# Patient Record
Sex: FEMALE | Race: WHITE | NOT HISPANIC OR LATINO | URBAN - METROPOLITAN AREA
[De-identification: names, ages, dates, MRNs, and addresses within clinical notes are randomized per-mention and may not be internally consistent; named-entity substitution may affect disease eponyms.]

---

## 2023-10-06 ENCOUNTER — APPOINTMENT (OUTPATIENT)
Dept: GASTROENTEROLOGY | Facility: CLINIC | Age: 33
End: 2023-10-06
Payer: COMMERCIAL

## 2023-10-08 PROBLEM — R10.9 BILATERAL FLANK PAIN: Status: ACTIVE | Noted: 2023-10-08

## 2023-10-08 PROBLEM — N13.70 VESICOURETERAL-REFLUX: Status: ACTIVE | Noted: 2023-10-08

## 2023-10-08 RX ORDER — IBUPROFEN 800 MG/1
TABLET ORAL
COMMUNITY

## 2023-10-08 RX ORDER — MUPIROCIN 20 MG/G
OINTMENT TOPICAL
COMMUNITY
End: 2023-10-10 | Stop reason: WASHOUT

## 2023-10-08 RX ORDER — POLYMYXIN B SULFATE AND TRIMETHOPRIM 1; 10000 MG/ML; [USP'U]/ML
SOLUTION OPHTHALMIC
COMMUNITY
End: 2023-10-10 | Stop reason: WASHOUT

## 2023-10-08 RX ORDER — DICYCLOMINE HYDROCHLORIDE 20 MG/1
20 TABLET ORAL 3 TIMES DAILY PRN
COMMUNITY

## 2023-10-10 ENCOUNTER — OFFICE VISIT (OUTPATIENT)
Dept: GASTROENTEROLOGY | Facility: CLINIC | Age: 33
End: 2023-10-10
Payer: COMMERCIAL

## 2023-10-10 VITALS
BODY MASS INDEX: 31.41 KG/M2 | HEIGHT: 64 IN | DIASTOLIC BLOOD PRESSURE: 79 MMHG | HEART RATE: 81 BPM | WEIGHT: 184 LBS | SYSTOLIC BLOOD PRESSURE: 116 MMHG

## 2023-10-10 DIAGNOSIS — K62.5 RECTAL BLEEDING: ICD-10-CM

## 2023-10-10 DIAGNOSIS — R10.30 LOWER ABDOMINAL PAIN: Chronic | ICD-10-CM

## 2023-10-10 DIAGNOSIS — K52.9 CHRONIC DIARRHEA: Primary | Chronic | ICD-10-CM

## 2023-10-10 PROCEDURE — 99204 OFFICE O/P NEW MOD 45 MIN: CPT | Performed by: PHYSICIAN ASSISTANT

## 2023-10-10 RX ORDER — FAMOTIDINE 20 MG/1
20 TABLET, FILM COATED ORAL DAILY
COMMUNITY

## 2023-10-10 RX ORDER — POLYETHYLENE GLYCOL 3350, SODIUM SULFATE ANHYDROUS, SODIUM BICARBONATE, SODIUM CHLORIDE, POTASSIUM CHLORIDE 236; 22.74; 6.74; 5.86; 2.97 G/4L; G/4L; G/4L; G/4L; G/4L
4000 POWDER, FOR SOLUTION ORAL ONCE
Qty: 4000 ML | Refills: 0 | Status: SHIPPED | OUTPATIENT
Start: 2023-10-10 | End: 2023-10-10

## 2023-10-10 NOTE — PROGRESS NOTES
"Subjective   Patient ID: Nataliya Bal is a 33 y.o. female who was referred by her PCP for New Patient Visit (Referred by Sravanthi Delong for IBS with alternating bowel habits (More diarrhea than constipation)/Colon: none per pt.).    Patient's PCP is EUSEBIO Delong.    HPI  Patient states that she has had long standing issues with constipation for \"forever\". However, in the past, she was more constipated requiring miralax. Over the past year, she has had a change in her bowel habits to more loose stool. She states that some days she can have loose to watery stools, other times can go 2-3 days without a bowel movements. She has not regularly tried anything. She states that she does have stool urgency as well and has had some fecal incontinence. She states that she does associated lower abdominal pain, that is cramp like in nature. She states that her PCP prescribed her dicyclomine, which does helpo but she feels that it makes her then constipated. She denies unexplained weight loss, dysphagia, melena. She states that she does have \"acid reflux\" which she states causes nausea but no regular vomiting. She has never had a colonoscopy before. She denies NSAID use. She smokes 3 cigarettes daily, drinks 1 beer daily, no illicit drug use. She does not know her family history.     Prior GI evaluation:  None    Review of Systems:  Constitutional: No reported fever, chills, or weight loss.  Skin: No reported icterus, lesions, or rash.  Eye: No reported itching, pain, vision changes.  Ear: No reported discharge, hearing loss, or pain.  Nose: No reported congestion, discharge, or epistaxis.  Mouth/throat: No reported dysphagia, hoarseness, or throat pain.  Resp: No reported cough, dyspnea, or wheezing.  Cardiovascular: No reported chest pain, lower extremity edema, or palpitations.   GI: Reports lower abdominal pain, diarrhea, rectal bleeding  : No reported dysuria, hematuria, or frequency.  Neuro: No reported confusion, memory loss, " headaches, or dizziness.  Psych: No reported anxiety, depression, or insomnia.  Musculoskeletal: No reported arthralgia, joint swelling, or myalgias.  Heme/lymph: No reported easy bleeding or bruising, or swollen lymph nodes.  Endocrine: No reported cold/heat intolerance, polydipsia, or polyuria.      Medications:  Prior to Admission medications    Medication Sig Start Date End Date Taking? Authorizing Provider   dicyclomine (Bentyl) 20 mg tablet Take 1 tablet (20 mg) by mouth 3 times a day as needed.   Yes Historical Provider, MD   famotidine (Pepcid) 20 mg tablet Take 1 tablet (20 mg) by mouth once daily.   Yes Historical Provider, MD   ibuprofen (IBU) 800 mg tablet IBU TABS   Refills: 0       Active   Yes Historical Provider, MD [place] 236-22.74-6.74 -5.86 gram solution Take 4,000 mL by mouth 1 time for 1 dose. 10/10/23 10/10/23  Linda Calloway PA-C   mupirocin (Bactroban) 2 % ointment   10/10/23  Historical Provider, MD   polymyxin B sulf-trimethoprim (Polytrim) ophthalmic solution   10/10/23  Historical Provider, MD       Allergies:  Ciprofloxacin    Past Medical History:  She has no past medical history on file.    Past Surgical History:  She has a past surgical history that includes Other surgical history (03/17/2022) and Other surgical history (03/17/2022).    Social History:  She reports that she has been smoking cigarettes. She has quit using smokeless tobacco.  Her smokeless tobacco use included chew. She reports current alcohol use of about 7.0 standard drinks of alcohol per week. She reports that she does not use drugs.    Objective   Physical exam:  Constitutional: Well developed, well nourished 33 y.o. year old in no acute distress.   Skin: Warm and dry. Normal turgor. No rash, ulcer, trauma, jaundice.   Eyes: Pupils symmetric and reactive to light.  Respiratory: Clear to auscultation bilaterally. No wheezes, rhonchi, or rales heard.  Cardiovascular: Regular rate and rhythm. S1 and S2  "appreciated and normal. No murmur, rub, or gallop heard.   Edema: No edema noted.  GI: Normal bowel sounds. Soft, non-distended, TTP in LLQ. No rebound or guarding present. No hepatomegaly or splenomegaly appreciated. Abdominal aorta not palpably abnormal.  Musculoskeletal: Limbs and Joints grossly normal. Full range of motion in major joint.   Neuro: Alert and oriented x 3. Cranial nerves 2-12 grossly intact.   Psych: Normal mood and affect.        Relevant Results Recent labs reviewed in the EMR.  No results found for: \"HGB\", \"MCV\", \"PLT\"    No results found for: \"FERRITIN\", \"IRON\"    No results found for: \"NA\", \"K\", \"CL\", \"BUN\", \"CREATININE\"    No results found for: \"BILITOT\"  No results found for: \"ALT\", \"AST\", \"ALKPHOS\"    No results found for: \"CRP\"    No results found for: \"CALPS\"    Radiology: Reviewed imaging reviewed in the EMR.  No results found.    Assessment/Plan   Problem List Items Addressed This Visit             ICD-10-CM       Gastrointestinal and Abdominal    Chronic diarrhea - Primary (Chronic) K52.9     Patient used to have constipation, now diarrhea. States that sometimes she can go several times a day, other times will skip a few days. She is taking dicyclomine, which helps but then makes her feel more constipation like she \"can't go\". She hasn't tried imodium. States that she took fiber here and there a few times. Will order chronic diarrhea labs and stool studies and colonoscopy for further evaluation. I recommended fiber and imodium as needed.         Relevant Medications    Golytely 236-22.74-6.74 -5.86 gram solution    Other Relevant Orders    C. difficile, PCR    Calprotectin, Fecal    C-Reactive Protein    Ova/Para + Giardia/Cryptosporidium Antigen    Pancreatic Elastase, Fecal    Stool Pathogen Panel, PCR    Thyroid Stimulating Hormone    Tissue Transglutaminase IgA    Colonoscopy Diagnostic    CBC    Follow Up In Gastroenterology    Lower abdominal pain (Chronic) R10.30     Patient " with lower abdominal pain. Bentyl is helping some. Recommended she continue.          Relevant Medications    Golytely 236-22.74-6.74 -5.86 gram solution    Other Relevant Orders    Colonoscopy Diagnostic    CBC    Follow Up In Gastroenterology    Rectal bleeding K62.5     Patient with intermittent bright red blood on toilet paper. May be hemorrhoids but will order CBC, CRP, and calprotectin for further evaluation. Colonoscopy ordered as well.         Relevant Medications    Golytely 236-22.74-6.74 -5.86 gram solution    Other Relevant Orders    Colonoscopy Diagnostic    CBC    Follow Up In Gastroenterology         Linda Calloway PA-C

## 2023-10-10 NOTE — ASSESSMENT & PLAN NOTE
Patient with intermittent bright red blood on toilet paper. May be hemorrhoids but will order CBC, CRP, and calprotectin for further evaluation. Colonoscopy ordered as well.

## 2023-10-10 NOTE — ASSESSMENT & PLAN NOTE
"Patient used to have constipation, now diarrhea. States that sometimes she can go several times a day, other times will skip a few days. She is taking dicyclomine, which helps but then makes her feel more constipation like she \"can't go\". She hasn't tried imodium. States that she took fiber here and there a few times. Will order chronic diarrhea labs and stool studies and colonoscopy for further evaluation. I recommended fiber and imodium as needed.  "

## 2023-10-10 NOTE — PATIENT INSTRUCTIONS
Thank you for coming in for your appointment.   -Get labs and stool studies done  -Get colonoscopy done  -Try daily fiber supplement, such as metamucil or benefiber daily. Use imodium as needed for diarrhea.    Call [phone] with questions or concerns.

## 2023-11-28 ENCOUNTER — ANESTHESIA EVENT (OUTPATIENT)
Dept: GASTROENTEROLOGY | Facility: HOSPITAL | Age: 33
End: 2023-11-28
Payer: COMMERCIAL

## 2023-11-29 ENCOUNTER — ANESTHESIA (OUTPATIENT)
Dept: GASTROENTEROLOGY | Facility: HOSPITAL | Age: 33
End: 2023-11-29
Payer: COMMERCIAL

## 2023-11-29 ENCOUNTER — HOSPITAL ENCOUNTER (OUTPATIENT)
Dept: GASTROENTEROLOGY | Facility: HOSPITAL | Age: 33
Discharge: HOME | End: 2023-11-29
Payer: COMMERCIAL

## 2023-11-29 VITALS
TEMPERATURE: 97 F | WEIGHT: 185 LBS | RESPIRATION RATE: 16 BRPM | SYSTOLIC BLOOD PRESSURE: 110 MMHG | OXYGEN SATURATION: 98 % | DIASTOLIC BLOOD PRESSURE: 81 MMHG | HEIGHT: 63 IN | BODY MASS INDEX: 32.78 KG/M2 | HEART RATE: 68 BPM

## 2023-11-29 DIAGNOSIS — K52.9 CHRONIC DIARRHEA: ICD-10-CM

## 2023-11-29 DIAGNOSIS — R10.30 LOWER ABDOMINAL PAIN: ICD-10-CM

## 2023-11-29 DIAGNOSIS — K62.5 RECTAL BLEEDING: ICD-10-CM

## 2023-11-29 LAB — PREGNANCY TEST URINE, POC: NEGATIVE

## 2023-11-29 PROCEDURE — 7100000010 HC PHASE TWO TIME - EACH INCREMENTAL 1 MINUTE: Performed by: INTERNAL MEDICINE

## 2023-11-29 PROCEDURE — 45380 COLONOSCOPY AND BIOPSY: CPT | Performed by: INTERNAL MEDICINE

## 2023-11-29 PROCEDURE — 7100000009 HC PHASE TWO TIME - INITIAL BASE CHARGE: Performed by: INTERNAL MEDICINE

## 2023-11-29 PROCEDURE — 88305 TISSUE EXAM BY PATHOLOGIST: CPT | Mod: TC,SUR,PORLAB | Performed by: INTERNAL MEDICINE

## 2023-11-29 PROCEDURE — 2500000004 HC RX 250 GENERAL PHARMACY W/ HCPCS (ALT 636 FOR OP/ED): Performed by: INTERNAL MEDICINE

## 2023-11-29 PROCEDURE — 81025 URINE PREGNANCY TEST: CPT | Performed by: INTERNAL MEDICINE

## 2023-11-29 PROCEDURE — 2500000004 HC RX 250 GENERAL PHARMACY W/ HCPCS (ALT 636 FOR OP/ED): Performed by: NURSE ANESTHETIST, CERTIFIED REGISTERED

## 2023-11-29 PROCEDURE — 3700000001 HC GENERAL ANESTHESIA TIME - INITIAL BASE CHARGE: Performed by: INTERNAL MEDICINE

## 2023-11-29 PROCEDURE — 2500000005 HC RX 250 GENERAL PHARMACY W/O HCPCS: Performed by: NURSE ANESTHETIST, CERTIFIED REGISTERED

## 2023-11-29 PROCEDURE — 88305 TISSUE EXAM BY PATHOLOGIST: CPT | Performed by: PATHOLOGY

## 2023-11-29 PROCEDURE — 94760 N-INVAS EAR/PLS OXIMETRY 1: CPT

## 2023-11-29 PROCEDURE — 3700000002 HC GENERAL ANESTHESIA TIME - EACH INCREMENTAL 1 MINUTE: Performed by: INTERNAL MEDICINE

## 2023-11-29 RX ORDER — LOPERAMIDE HCL 2 MG
2 TABLET ORAL 4 TIMES DAILY PRN
Qty: 120 TABLET | Refills: 3 | Status: SHIPPED | OUTPATIENT
Start: 2023-11-29

## 2023-11-29 RX ORDER — FENTANYL CITRATE 50 UG/ML
INJECTION, SOLUTION INTRAMUSCULAR; INTRAVENOUS AS NEEDED
Status: DISCONTINUED | OUTPATIENT
Start: 2023-11-29 | End: 2023-11-29

## 2023-11-29 RX ORDER — LIDOCAINE HYDROCHLORIDE 20 MG/ML
INJECTION, SOLUTION EPIDURAL; INFILTRATION; INTRACAUDAL; PERINEURAL AS NEEDED
Status: DISCONTINUED | OUTPATIENT
Start: 2023-11-29 | End: 2023-11-29

## 2023-11-29 RX ORDER — SODIUM CHLORIDE 9 MG/ML
20 INJECTION, SOLUTION INTRAVENOUS CONTINUOUS
Status: DISCONTINUED | OUTPATIENT
Start: 2023-11-29 | End: 2023-11-30 | Stop reason: HOSPADM

## 2023-11-29 RX ORDER — PROPOFOL 10 MG/ML
INJECTION, EMULSION INTRAVENOUS AS NEEDED
Status: DISCONTINUED | OUTPATIENT
Start: 2023-11-29 | End: 2023-11-29

## 2023-11-29 RX ADMIN — PROPOFOL 50 MG: 10 INJECTION, EMULSION INTRAVENOUS at 08:05

## 2023-11-29 RX ADMIN — PROPOFOL 50 MG: 10 INJECTION, EMULSION INTRAVENOUS at 08:11

## 2023-11-29 RX ADMIN — PROPOFOL 50 MG: 10 INJECTION, EMULSION INTRAVENOUS at 08:02

## 2023-11-29 RX ADMIN — LIDOCAINE HYDROCHLORIDE 40 MG: 20 INJECTION, SOLUTION EPIDURAL; INFILTRATION; INTRACAUDAL; PERINEURAL at 07:59

## 2023-11-29 RX ADMIN — FENTANYL CITRATE 25 MCG: 50 INJECTION, SOLUTION INTRAMUSCULAR; INTRAVENOUS at 08:02

## 2023-11-29 RX ADMIN — PROPOFOL 50 MG: 10 INJECTION, EMULSION INTRAVENOUS at 08:01

## 2023-11-29 RX ADMIN — FENTANYL CITRATE 25 MCG: 50 INJECTION, SOLUTION INTRAMUSCULAR; INTRAVENOUS at 08:01

## 2023-11-29 RX ADMIN — FENTANYL CITRATE 50 MCG: 50 INJECTION, SOLUTION INTRAMUSCULAR; INTRAVENOUS at 07:59

## 2023-11-29 RX ADMIN — PROPOFOL 50 MG: 10 INJECTION, EMULSION INTRAVENOUS at 08:07

## 2023-11-29 RX ADMIN — PROPOFOL 50 MG: 10 INJECTION, EMULSION INTRAVENOUS at 08:09

## 2023-11-29 RX ADMIN — SODIUM CHLORIDE 20 ML/HR: 9 INJECTION, SOLUTION INTRAVENOUS at 07:45

## 2023-11-29 RX ADMIN — PROPOFOL 100 MG: 10 INJECTION, EMULSION INTRAVENOUS at 07:59

## 2023-11-29 SDOH — HEALTH STABILITY: MENTAL HEALTH: CURRENT SMOKER: 0

## 2023-11-29 ASSESSMENT — PAIN SCALES - GENERAL
PAINLEVEL_OUTOF10: 2
PAINLEVEL_OUTOF10: 0 - NO PAIN
PAIN_LEVEL: 0
PAINLEVEL_OUTOF10: 0 - NO PAIN
PAINLEVEL_OUTOF10: 5 - MODERATE PAIN

## 2023-11-29 ASSESSMENT — COLUMBIA-SUICIDE SEVERITY RATING SCALE - C-SSRS
6. HAVE YOU EVER DONE ANYTHING, STARTED TO DO ANYTHING, OR PREPARED TO DO ANYTHING TO END YOUR LIFE?: YES
6. HAVE YOU EVER DONE ANYTHING, STARTED TO DO ANYTHING, OR PREPARED TO DO ANYTHING TO END YOUR LIFE?: NO
2. HAVE YOU ACTUALLY HAD ANY THOUGHTS OF KILLING YOURSELF?: NO
1. IN THE PAST MONTH, HAVE YOU WISHED YOU WERE DEAD OR WISHED YOU COULD GO TO SLEEP AND NOT WAKE UP?: NO

## 2023-11-29 ASSESSMENT — PAIN - FUNCTIONAL ASSESSMENT
PAIN_FUNCTIONAL_ASSESSMENT: 0-10

## 2023-11-29 NOTE — ANESTHESIA POSTPROCEDURE EVALUATION
Patient: Nataliya Bal    Procedure Summary       Date: 11/29/23 Room / Location: [facility]    Anesthesia Start: 0754 Anesthesia Stop: 0819    Procedure: COLONOSCOPY Diagnosis:       Chronic diarrhea      Lower abdominal pain      Rectal bleeding    Scheduled Providers: Andriy Villafuerte DO Responsible Provider: LUDIVINA John    Anesthesia Type: MAC ASA Status: 2            Anesthesia Type: MAC    Vitals Value Taken Time   /68 11/29/23 0819   Temp 36.3 °C (97.3 °F) 11/29/23 0819   Pulse 73 11/29/23 0819   Resp 16 11/29/23 0819   SpO2 94 % 11/29/23 0819       Anesthesia Post Evaluation    Patient location during evaluation: bedside  Patient participation: complete - patient participated  Level of consciousness: awake and alert  Pain score: 0  Pain management: adequate  Airway patency: patent  Cardiovascular status: acceptable  Respiratory status: acceptable  Hydration status: acceptable  Postoperative Nausea and Vomiting: none        There were no known notable events for this encounter.

## 2023-11-29 NOTE — H&P
History Of Present Illness  Nataliya Bal is a 33 y.o. female presenting with diarrhea.       No  blood with the prep.   Past Medical History  Past Medical History:   Diagnosis Date    Abdominal pain     Diarrhea     Rectal bleeding        Surgical History  Past Surgical History:   Procedure Laterality Date    OTHER SURGICAL HISTORY  03/17/2022    Kidney surgery    OTHER SURGICAL HISTORY  03/17/2022    Tonsillectomy with adenoidectomy        Social History  She reports that she quit smoking about 10 months ago. Her smoking use included cigarettes. She has quit using smokeless tobacco.  Her smokeless tobacco use included chew. She reports current alcohol use of about 7.0 standard drinks of alcohol per week. She reports that she does not use drugs.    Family History  Family History   Adopted: Yes        Allergies  Ciprofloxacin    Review of Systems     Physical Exam  Vitals reviewed.   Constitutional:       General: She is awake.      Appearance: Normal appearance.   HENT:      Head: Normocephalic.      Mouth/Throat:      Mouth: Mucous membranes are moist.   Eyes:      Extraocular Movements: Extraocular movements intact.   Cardiovascular:      Rate and Rhythm: Normal rate.      Heart sounds: Normal heart sounds.   Pulmonary:      Effort: Pulmonary effort is normal.      Breath sounds: Normal breath sounds.   Abdominal:      General: Bowel sounds are normal.      Palpations: Abdomen is soft.      Tenderness: There is no abdominal tenderness. There is no guarding or rebound.      Hernia: No hernia is present.   Musculoskeletal:         General: Normal range of motion.      Cervical back: Neck supple.   Skin:     General: Skin is warm and dry.   Neurological:      General: No focal deficit present.      Mental Status: She is alert.   Psychiatric:         Attention and Perception: Attention and perception normal.         Mood and Affect: Mood normal.         Behavior: Behavior normal.          Last Recorded Vitals  Blood  "pressure 120/84, pulse 77, temperature 36.3 °C (97.3 °F), temperature source Temporal, resp. rate 16, height 1.6 m (5' 3\"), weight 83.9 kg (185 lb), SpO2 99 %.    Relevant Results        Current Outpatient Medications   Medication Instructions    dicyclomine (BENTYL) 20 mg, oral, 3 times daily PRN    famotidine (PEPCID) 20 mg, oral, Daily    ibuprofen (IBU) 800 mg tablet IBU TABS   Refills: 0       Active          Assessment/Plan       Chronic Diarrhea   -did not obtain stool studies   - colonoscopy for rajni Villafuerte DO    "

## 2023-11-29 NOTE — ANESTHESIA PREPROCEDURE EVALUATION
Patient: Nataliya Bal    Procedure Information       Date/Time: 11/29/23 0815    Scheduled providers: Andriy Villafuerte DO    Procedure: COLONOSCOPY    Location: [facility]            Relevant Problems   Anesthesia (within normal limits)      Cardiovascular (within normal limits)      Endocrine (within normal limits)      GI   (+) Chronic diarrhea   (+) Rectal bleeding      /Renal (within normal limits)      Neuro/Psych (within normal limits)      GI/Hepatic (within normal limits)      Hematology (within normal limits)      Musculoskeletal (within normal limits)       Clinical information reviewed:   Tobacco  Allergies  Meds   Med Hx  Surg Hx  OB Status  Fam Hx  Soc   Hx        NPO Detail:  NPO/Void Status  Date of Last Liquid: 11/29/23  Time of Last Liquid: 0425  Date of Last Solid: 11/27/23  Last Intake Type: Clear fluids         Physical Exam    Airway  Mallampati: II  TM distance: >3 FB  Neck ROM: full     Cardiovascular - normal exam  Rhythm: regular     Dental - normal exam     Pulmonary - normal exam     Abdominal - normal exam             Anesthesia Plan    ASA 2     MAC     The patient is not a current smoker.    intravenous induction   Anesthetic plan and risks discussed with patient.

## 2023-11-29 NOTE — LETTER
"December 13, 2023     Nataliya Bal  [address] [facility] [address]      Dear Ms. Bal:    Below are the results from your recent visit:      Biopsies from the colonoscopy were normal     Follow up in office as instructed at time of procedure.        If you have any questions or concerns, please don't hesitate to call.         Sincerely,        Andriy Villafuerte DO                                      Resulted Orders   Surgical Pathology Exam   Result Value Ref Range    Case Report       Surgical Pathology                                Case: [id number]                                  Authorizing Provider:  Andriy Villafuerte DO    Collected:           11/29/2023 0807              [facility]    Received:            11/29/2023 1218                                     [facility]                                                                     Pathologist:           Le Avalos MD PhD                                                              Specimens:   A) - TERMINAL ILEUM BIOPSY                                                                          B) - COLON  - RANDOM BIOPSY                                                                FINAL DIAGNOSIS       A.  Terminal ileum, biopsy:  -Small intestinal mucosa with no specific abnormality.    B.  Colon, random, biopsy:  -Colonic mucosa with no specific abnormality.              By the signature on this report, the individual or group listed as making the Final Interpretation/Diagnosis certifies that they have reviewed this case.       Clinical History       Chronic diarrhea K52.9  Lower abdominal pain R10.30  Rectal bleeding K62.5  A) rule out Crohn's  B) rule out microscopic colitis      Gross Description       A: Received in formalin, labeled with the patient's name and hospital number and \"1\", is 1 fragment of tan, soft tissue measuring 0.5 x 0.3 x 0.2 cm. The specimen is submitted in toto in one cassette.  [facility]  B: " "Received in formalin, labeled with the patient's name and hospital number and \"2\", are multiple fragments of tan, soft tissue aggregating to 1.1 x 0.3 x 0.2 cm. The specimen is submitted in toto in one cassette.  "

## 2023-11-30 NOTE — ADDENDUM NOTE
Encounter addended by: Maya Barreto RN on: 11/30/2023 10:30 AM   Actions taken: Contacts section saved, Flowsheet accepted

## 2023-12-11 LAB
LABORATORY COMMENT REPORT: NORMAL
PATH REPORT.FINAL DX SPEC: NORMAL
PATH REPORT.GROSS SPEC: NORMAL
PATH REPORT.RELEVANT HX SPEC: NORMAL
PATH REPORT.TOTAL CANCER: NORMAL